# Patient Record
Sex: MALE | Race: WHITE
[De-identification: names, ages, dates, MRNs, and addresses within clinical notes are randomized per-mention and may not be internally consistent; named-entity substitution may affect disease eponyms.]

---

## 2020-10-20 ENCOUNTER — HOSPITAL ENCOUNTER (OUTPATIENT)
Dept: HOSPITAL 47 - RADMRIMAIN | Age: 65
Discharge: HOME | End: 2020-10-20
Attending: CLINICAL NURSE SPECIALIST
Payer: MEDICARE

## 2020-10-20 DIAGNOSIS — H93.19: Primary | ICD-10-CM

## 2020-10-20 PROCEDURE — 70544 MR ANGIOGRAPHY HEAD W/O DYE: CPT

## 2020-10-20 NOTE — MR
EXAMINATION TYPE: MR angio head wo con

 

DATE OF EXAM: 10/20/2020

 

COMPARISON: None

 

HISTORY: 65 year-old male E11.9, H93.19, family history

 

TECHNIQUE: High-resolution 3-D time-of-flight imaging of the [place]. Rotational 3-D reconst
ructions generated on a dedicated independent workstation.

 

FINDINGS: 

The vertebral and basilar arteries are patent.

 

The internal carotid arteries are patent.

 

There is a tiny 1.5 mm infundibulum at the origin of the left posterior communicating artery.

 

Anterior and posterior circulations are patent.

 

Otherwise, no aneurysmal changes identified.

 

 

 

IMPRESSION: 

No large vessel intracranial arterial occlusion, significant stenosis, or aneurysmal change is seen.